# Patient Record
Sex: MALE | Race: WHITE | NOT HISPANIC OR LATINO | Employment: FULL TIME | URBAN - METROPOLITAN AREA
[De-identification: names, ages, dates, MRNs, and addresses within clinical notes are randomized per-mention and may not be internally consistent; named-entity substitution may affect disease eponyms.]

---

## 2023-08-22 ENCOUNTER — OFFICE VISIT (OUTPATIENT)
Dept: URGENT CARE | Facility: CLINIC | Age: 33
End: 2023-08-22
Payer: COMMERCIAL

## 2023-08-22 VITALS
HEART RATE: 104 BPM | DIASTOLIC BLOOD PRESSURE: 87 MMHG | OXYGEN SATURATION: 97 % | SYSTOLIC BLOOD PRESSURE: 177 MMHG | WEIGHT: 255 LBS | RESPIRATION RATE: 18 BRPM | BODY MASS INDEX: 32.73 KG/M2 | HEIGHT: 74 IN | TEMPERATURE: 96.9 F

## 2023-08-22 DIAGNOSIS — M25.561 ACUTE PAIN OF RIGHT KNEE: Primary | ICD-10-CM

## 2023-08-22 PROCEDURE — G0382 LEV 3 HOSP TYPE B ED VISIT: HCPCS | Performed by: PHYSICIAN ASSISTANT

## 2023-08-22 RX ORDER — PREDNISONE 10 MG/1
10 TABLET ORAL DAILY
Qty: 21 TABLET | Refills: 0 | Status: SHIPPED | OUTPATIENT
Start: 2023-08-22

## 2023-08-22 NOTE — LETTER
August 22, 2023     Patient: Pierre Her   YOB: 1990   Date of Visit: 8/22/2023       To Whom It May Concern: It is my medical opinion that Pierre Her should remain out of work until 8/28/2023 or sooner if symptoms improve .            Sincerely,        Cris Ge PA-C

## 2023-08-22 NOTE — PROGRESS NOTES
[facility]        NAME: iKa Romero is a 35 y.o. male  : 1990    MRN: [id number]  DATE: 2023  TIME: 5:36 PM    Assessment and Plan   Acute pain of right knee [M25.561]  1. Acute pain of right knee  predniSONE 10 mg tablet    Ambulatory Referral to Physical Therapy    Ambulatory Referral to Orthopedic Surgery            Patient Instructions   Prednisone. Tylenol. Ice. Range of motion. Physical therapy. Orthopedics. Follow up with PCP in 3-5 days. Proceed to  ER if symptoms worsen. Chief Complaint     Chief Complaint   Patient presents with   • Knee Pain     Pt reports R sided knee pain since Friday, denies any trauma         History of Present Illness       Patient a 51-year-old male with no significant past medical history presents the office complaining of right knee pain for 5 days. Pain is located the anterior proximal aspect described as 6 out of 10 which is worse with knee flexion. Denies any injury or trauma. States he was kneeling on and off for approximately 5 hours while at work which is abnormal for him. He does have a history of gout and admits to drinking a lot this weekend due to his sister's wedding. Review of Systems   Review of Systems   Musculoskeletal: Positive for arthralgias. Negative for joint swelling. Neurological: Positive for numbness. Current Medications       Current Outpatient Medications:   •  predniSONE 10 mg tablet, Take 1 tablet (10 mg total) by mouth daily Take 6 on day 1, take 5 on day 2, take 4 on day 3, take 3 on day 4, take 2 on day 5, take 1 on day 6., Disp: 21 tablet, Rfl: 0    Current Allergies     Allergies as of 2023   • (No Known Allergies)            The following portions of the patient's history were reviewed and updated as appropriate: allergies, current medications, past family history, past medical history, past social history, past surgical history and problem list.     History reviewed.  No pertinent past medical history. Past Surgical History:   Procedure Laterality Date   • HERNIA REPAIR     • SHOULDER SURGERY Left        History reviewed. No pertinent family history. Medications have been verified. Objective   BP (!) 177/87   Pulse 104   Temp (!) 96.9 °F (36.1 °C)   Resp 18   Ht 6' 2" (1.88 m)   Wt 116 kg (255 lb)   SpO2 97%   BMI 32.74 kg/m²   No LMP for male patient. Physical Exam     Physical Exam  Vitals and nursing note reviewed. Constitutional:       Appearance: He is well-developed. HENT:      Head: Normocephalic and atraumatic. Right Ear: External ear normal.      Left Ear: External ear normal.      Nose: Nose normal.   Eyes:      General: Lids are normal.      Conjunctiva/sclera: Conjunctivae normal.   Musculoskeletal:      Right knee: No swelling or bony tenderness. Decreased range of motion. Tenderness (quad tendon) present. No patellar tendon tenderness. No LCL laxity or MCL laxity. Instability Tests: Anterior drawer test negative. Posterior drawer test negative. Anterior Lachman test negative. Medial Loreta test negative and lateral Loreta test negative. Skin:     General: Skin is warm and dry. Capillary Refill: Capillary refill takes less than 2 seconds. Neurological:      Mental Status: He is alert.